# Patient Record
(demographics unavailable — no encounter records)

---

## 2024-11-07 NOTE — HEALTH RISK ASSESSMENT
[Yes] : Yes [2 - 4 times a month (2 pts)] : 2-4 times a month (2 points) [1 or 2 (0 pts)] : 1 or 2 (0 points) [Never (0 pts)] : Never (0 points) [No] : In the past 12 months have you used drugs other than those required for medical reasons? No [No falls in past year] : Patient reported no falls in the past year [0] : 2) Feeling down, depressed, or hopeless: Not at all (0) [PHQ-2 Negative - No further assessment needed] : PHQ-2 Negative - No further assessment needed [HIV test declined] : HIV test declined [Hepatitis C test declined] : Hepatitis C test declined [None] : None [With Family] : lives with family [Employed] : employed [] :  [# Of Children ___] : has [unfilled] children [Sexually Active] : sexually active [Feels Safe at Home] : Feels safe at home [Fully functional (bathing, dressing, toileting, transferring, walking, feeding)] : Fully functional (bathing, dressing, toileting, transferring, walking, feeding) [Fully functional (using the telephone, shopping, preparing meals, housekeeping, doing laundry, using] : Fully functional and needs no help or supervision to perform IADLs (using the telephone, shopping, preparing meals, housekeeping, doing laundry, using transportation, managing medications and managing finances) [With Patient/Caregiver] : , with patient/caregiver [Reviewed no changes] : Reviewed, no changes [I will adhere to the patient's wishes.] : I will adhere to the patient's wishes. [Former] : Former [10-14] : 10-14 [> 15 Years] : > 15 Years [Audit-CScore] : 2 [de-identified] : exercises 3-4x/wk; weigh lifting, runs on treadmill [de-identified] : well balanced [[id number]] : 0 [EyeExamDate] : 2022 [Change in mental status noted] : No change in mental status noted [Language] : denies difficulty with language [High Risk Behavior] : no high risk behavior [FreeTextEntry2] : sales for [employer]

## 2024-11-07 NOTE — HISTORY OF PRESENT ILLNESS
[FreeTextEntry1] : CPE. [de-identified] : Patient is a 48yo male who presents to the office for CPE.  Pt has not been seen at our practice in >3 years.   Last CPE:  10/09/2020, NIKOLAY Perez. Colonoscopy:  due, recommended today; no known family history of colon cancer.  Ophthalmology:  2022. Dermatology:  due, recommended today (Dr. Benz). Dentist:  [facility]. PSA:  no known family history of prostate cancer.  Testicular self-exams:  no reported abnormalities.  Regular testicular self-exams encouraged.  Shingles:  will be 50 on 11/10/24, recommended Shingrix once pt turns 50.  Flu:  declines today. Tdap:  03/2019. COVID:  declines.

## 2024-11-07 NOTE — ASSESSMENT
[FreeTextEntry1] : Patient is a 50yo male who presents to the office for CPE. Pt has not been seen at our practice in >3 years.  Health Maintenance - Due for CRC screening, GI referral provided. - Due for skin check, recommended today (Dr. Benz). - Fasting labs drawn in office. - EKG performed in office NSR, no acute ST/T changes, no arrhythmias.  Pt denies cardiac complaints. - Eat plenty of fruits and vegetables, especially deeply colored fruits/vegetables (such as leafy greens, peaches) that are more nutrient-dense.  Continue to work hard on diet and exercise, limiting/avoiding saturated fat, fatty foods, greasy foods, red meats, white flour-based carbohydrates (cookies, cakes, white bread, white rice), and added sugars.  Chose whole grain foods and products made with whole grains over refined grains and white flour-based carbohydrates.  Avoid beverages and food with added sugar.  Limit salt intake to improve blood pressure.  Limit alcohol intake. - Try and incorporate 30 minutes of aerobic exercise to your daily routine.  Call the office or go to the ED immediately if you develop new, worsening or concerning symptoms including high fever, severe headache/worst headache of your life, confusion, dizziness/lightheadedness, loss of consciousness, severe chest pain, difficulty breathing, shortness of breath, severe abdominal pain, excessive vomiting/diarrhea, inability to feel/move the extremities, or any other concerning symptoms.